# Patient Record
Sex: MALE | Race: WHITE
[De-identification: names, ages, dates, MRNs, and addresses within clinical notes are randomized per-mention and may not be internally consistent; named-entity substitution may affect disease eponyms.]

---

## 2017-06-05 ENCOUNTER — HOSPITAL ENCOUNTER (OUTPATIENT)
Dept: HOSPITAL 39 - US | Age: 27
Discharge: HOME | End: 2017-06-05
Attending: NURSE PRACTITIONER
Payer: COMMERCIAL

## 2017-06-05 DIAGNOSIS — D17.0: Primary | ICD-10-CM

## 2017-06-05 NOTE — US
EXAM DESCRIPTION: Soft Tissue,Head/Neck



CLINICAL HISTORY: 27 years, Male, SU[employer]



COMPARISON: None.



FINDINGS: 

Two areas of palpable concern are noted by the patient on the

right side of the neck posteriorly. There are two

similar-appearing subcutaneous areas of circumscribed uniform

fatty echogenicity consistent with lipomas. These measure 1.2 x

1.5 x 0.7 cm in size and 1.3 x 1.0 x 0.5 cm in size.



IMPRESSION: 

1.  The palpable areas in question are small benign-appearing

lipomas



Electronically signed by:  Shabbir Green MD  6/5/2017 2:31 PM

[facility] Workstation: [id number]

## 2018-11-16 ENCOUNTER — HOSPITAL ENCOUNTER (OUTPATIENT)
Dept: HOSPITAL 39 - MRI | Age: 28
End: 2018-11-16
Attending: CLINIC/CENTER
Payer: COMMERCIAL

## 2018-11-16 DIAGNOSIS — S93.491A: Primary | ICD-10-CM

## 2018-11-16 DIAGNOSIS — M25.471: ICD-10-CM

## 2018-11-16 DIAGNOSIS — M77.51: ICD-10-CM

## 2018-11-19 NOTE — MRI
MRI right ankle without contrast



INDICATION: Ankle pain swelling injury 6 days ago



TECHNIQUE: Noncontrast MR imaging right ankle







FINDINGS:



There is tendinosis with thickening of the distal posterior

tibialis tendon. No complete rupture or retraction. Mild

tendinopathy of the peroneal tendons.



Ill-defined diffuse tear of the anterior talofibular ligament.

Possible ossification from previous injury. No widening of the

syndesmosis.



The calcaneofibular ligament appears continuous.



There is edema surrounding the anterior talofibular ligament

indicating acute tear.



Achilles and plantar aponeurosis are intact.



Moderate ankle effusion. No acute fracture or osteochondral

lesion of the talar dome.



Deep deltoid is intact.



There is interstitial fissuring of the peroneus longus series 901

image 10.



Mild contusion lateral talus.







IMPRESSION:



Acute tear anterior talofibular ligament high-grade with active

edema



Contusion lateral talus



Moderate ankle effusion



Tendinosis of the peroneal tendons and distal posterior tibialis

without rupture or retraction



Mild interstitial fissuring peroneus longus without complete

split



Electronically signed by:  Mango Bermudez MD  11/19/2018 8:11 AM

[facility] Workstation: [id number]